# Patient Record
Sex: MALE | Race: OTHER | NOT HISPANIC OR LATINO
[De-identification: names, ages, dates, MRNs, and addresses within clinical notes are randomized per-mention and may not be internally consistent; named-entity substitution may affect disease eponyms.]

---

## 2022-12-07 PROBLEM — Z00.00 ENCOUNTER FOR PREVENTIVE HEALTH EXAMINATION: Status: ACTIVE | Noted: 2022-12-07

## 2022-12-08 ENCOUNTER — APPOINTMENT (OUTPATIENT)
Dept: UROLOGY | Facility: CLINIC | Age: 67
End: 2022-12-08

## 2022-12-08 DIAGNOSIS — E87.8 OTHER DISORDERS OF ELECTROLYTE AND FLUID BALANCE, NOT ELSEWHERE CLASSIFIED: ICD-10-CM

## 2022-12-08 DIAGNOSIS — Z78.9 OTHER SPECIFIED HEALTH STATUS: ICD-10-CM

## 2022-12-08 DIAGNOSIS — N48.1 BALANITIS: ICD-10-CM

## 2022-12-08 DIAGNOSIS — Z86.79 PERSONAL HISTORY OF OTHER DISEASES OF THE CIRCULATORY SYSTEM: ICD-10-CM

## 2022-12-08 PROCEDURE — 99205 OFFICE O/P NEW HI 60 MIN: CPT

## 2022-12-08 RX ORDER — CLOTRIMAZOLE AND BETAMETHASONE DIPROPIONATE 10; .5 MG/G; MG/G
1-0.05 CREAM TOPICAL TWICE DAILY
Qty: 1 | Refills: 1 | Status: ACTIVE | COMMUNITY
Start: 2022-12-08 | End: 1900-01-01

## 2022-12-08 NOTE — HISTORY OF PRESENT ILLNESS
[FreeTextEntry1] : Language: English\par Date of First visit: 12/8/2022\par Accompanied by: self\par Contact info: \par Referring Provider/PCP: Dr. Saavedra\par Fax: [phone]\par \par \par CC/ Problem List:\par \par Left Hydronephrosis\par BPH\par ED/PEJ\par \par ===============================================================================\par FIRST VISIT / Summary:\par Very pleasant 66 yo M referred for BPH/LUTS and CT findings. He also wishes to discuss use of viagra and PEJ. He rates erection 5/10 without medication and 7/10 with. Takes 100mg as needed. He does not wish this to be discussed with his wife.\par \par BPH - IPSS moderate, see intake. Primarily obstructive symptoms. He wished to try medical therapy.\par \par Hydronephrosis - left possible UPJO, some thickening, he is getting hernia repaired next week. He does not have any CVAT today or report any flank pain after drinking, and has about 2 beers/day. 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 Mild thickening of pelvis. Prostate measured approx 55grams by my measurements. Bladder low volume otherwise normal\par \par PATHOLOGY/CYTOLOGY:-------------------------------------------------------------------------------------------\par \par \par VOIDING STUDIES: ----------------------------------------------------------------------------------------------------\par \par \par STONE STUDIES: (Analysis/LLSA)----------------------------------------------------------------------------------\par \par \par \par PROCEDURES: -----------------------------------------------------------------------------------------------\par \par \par \par \par ===============================================================================\par \par PHYSICAL EXAM:\par \par GEN: AAOx3, NAD; \par \par BARRIERS to CARE: none\par \par PSYCH: Appropriate Behavior, Affect Congruent\par \par HEENT: AT/NC Trachea midline. EOMI.\par \par Lungs: No labored breathing.\par \par NEURO: + Movement, all 4 extremities grossly intact without deficits.\par \par SKIN: Warm dry. No visible rashes or ulcers\par \par GAIT: Gait normal, Stability good\par \par ABD: no suprapubic or CVAT\par \par \par  FOCUSED: --------------(done 12/8/2022)------------------------------------------------------------------------\par \par Penis: No tenderness, curvature, plaques. Uncircumcised. Clean but does have some very minor, small erythematous patches with ill defined borders.\par \par Meatus: normal caliber\par \par Testes: Descended bilaterally. Non tender, no palpable masses\par \par Epididymi: No palpable epididymal masses\par \par Scrotum: Scrotal wall normal. No spermatic cord mass. No palpable hydroceles \par \par Prostate: mildly enlarged approx 40g, nontender, symmetric, no nodules \par \par =======================================================================================\par DISCUSSION: \par The patient was prescribed an alpha blocker today for LUTS. Take by mouth once daily with food: do not cut, chew or crush this tablet  The patient understands that this medication may cause dizziness, retrograde ejaculation or nasal congestion among other complications. I recommended that the patient take this medication at night. If the side effects become too bothersome, the medication can be discontinued. The patient knows to alert his  eye doctor if he  requires cataract surgery after having taken this medication. \par \par \par PDE5 inhibitor information:\par The risks of this medication include facial redness and/or flushing, reflux (indigestion), headache, back pain, an erection that won't go away, chest pain or heart attack, dizziness, drop in blood pressure, loss of vision, blue vision, blurry vision and loss of hearing. The patient understands that he cannot take together with nitrates and that should he develop chest pain, he must alert emergency personnel if he has taken within the last 24 hours.  \par \par I recommend that you take the first dose by yourself so you can get acquainted with how this medication makes you feel and whether or not you will have any of the above side effects. The medication may work as soon as 20 minutes on an empty stomach but it can take up to 60 minutes (or longer) on a full stomach. The medication does requires sexual stimulation to work. If you take it without sexual stimulation, you will not get an erection. Please come to the ER for an erection that won't go away, chest pain, or loss of vision or hearing. The patient understood all of these instructions. He will call when he wishes to have medication sent.\par \par The patient has been screened for potential medication interactions. He is not on any po antifungals or HIV meds (protease inhibitors, NNRTI's). \par  \par  \par =======================================================================================\par ASSESSMENT and PLAN\par \par \par 1. BPH\par - tamsulosin\par - discussed RBA\par - f/u in 1 month to discuss\par \par 2. Hydronephrosis\par - UA and culture\par - CT urogram - if signs of delayed emptying, would then get NM lasix scan\par - some pelvic thickening, and hematospermia, together I recommended cystoscopy as well\par - Given asymptomatic would not operate at this time even if UPJO is found. Would monitor for renal function over time\par - cysto in January\par \par 3. Hematospermia\par - normally benign, however needs cysto in any case and this will evaluate urethra / bladder\par \par 4. ED / Premature EJ\par - has been taking sildenafil 100mg with good effect. \par - taking a spray he says works well for the PEJ\par - He will call when he wants script sent to [facility]\par \par 5. Balanitis vs contact dermatitis\par - clotrizone cream x1 week should treat both\par \par =======================================================================================\par \par Greater than 50% of this 70 minute visit was spent in direct face-to-face counseling with the patient or coordination with other care providers \par \par Thank you for allowing me to assist in the care of your patient. Should you have any questions please do not hesitate to reach out to me.\par \par \par Myron Kaiser MD\par [facility]\par [facility] [place]\par [address], [address]\[place] [address]\par T: [phone]\par F: [phone]

## 2022-12-09 LAB
APPEARANCE: CLEAR
BACTERIA: NEGATIVE
BILIRUBIN URINE: NEGATIVE
BLOOD URINE: NEGATIVE
COLOR: YELLOW
GLUCOSE QUALITATIVE U: NEGATIVE
HYALINE CASTS: 0 /LPF
KETONES URINE: NEGATIVE
LEUKOCYTE ESTERASE URINE: NEGATIVE
MICROSCOPIC-UA: NORMAL
NITRITE URINE: NEGATIVE
PH URINE: 7.5
PROTEIN URINE: NORMAL
RED BLOOD CELLS URINE: 1 /HPF
SPECIFIC GRAVITY URINE: 1.01
SQUAMOUS EPITHELIAL CELLS: 0 /HPF
UROBILINOGEN URINE: NORMAL
WHITE BLOOD CELLS URINE: 0 /HPF

## 2022-12-12 LAB — BACTERIA UR CULT: NORMAL

## 2023-01-10 ENCOUNTER — APPOINTMENT (OUTPATIENT)
Dept: UROLOGY | Facility: CLINIC | Age: 68
End: 2023-01-10
Payer: MEDICARE

## 2023-01-10 VITALS
OXYGEN SATURATION: 98 % | HEART RATE: 67 BPM | RESPIRATION RATE: 16 BRPM | SYSTOLIC BLOOD PRESSURE: 148 MMHG | TEMPERATURE: 98.6 F | HEIGHT: 65 IN | WEIGHT: 197 LBS | BODY MASS INDEX: 32.82 KG/M2 | DIASTOLIC BLOOD PRESSURE: 86 MMHG

## 2023-01-10 VITALS
SYSTOLIC BLOOD PRESSURE: 148 MMHG | RESPIRATION RATE: 16 BRPM | OXYGEN SATURATION: 98 % | TEMPERATURE: 98.6 F | DIASTOLIC BLOOD PRESSURE: 86 MMHG | HEART RATE: 67 BPM | WEIGHT: 197 LBS | HEIGHT: 65 IN | BODY MASS INDEX: 32.82 KG/M2

## 2023-01-10 PROCEDURE — 52000 CYSTOURETHROSCOPY: CPT

## 2023-01-10 PROCEDURE — 99215 OFFICE O/P EST HI 40 MIN: CPT | Mod: 25

## 2023-01-10 RX ORDER — SILDENAFIL 100 MG/1
100 TABLET, FILM COATED ORAL
Qty: 10 | Refills: 3 | Status: ACTIVE | COMMUNITY
Start: 2023-01-10 | End: 1900-01-01

## 2023-01-10 NOTE — HISTORY OF PRESENT ILLNESS
[FreeTextEntry1] : Language: English\par Date of First visit: 12/8/2022\par Accompanied by: self\par Contact info: \par Referring Provider/PCP: Dr. Saavedra\par Fax: [phone]\par \par \par CC/ Problem List:\par \par Left Hydronephrosis\par BPH\par ED/PEJ\par \par ===============================================================================\par FIRST VISIT / Summary:\par Very pleasant 66 yo M referred for BPH/LUTS and CT findings. He also wishes to discuss use of viagra and PEJ and balanitis. He rates erection 5/10 without medication and 7/10 with. Takes 100mg as needed. He does not wish this to be discussed with his wife.\par \par BPH - IPSS moderate, see intake. Primarily obstructive symptoms. He wished to try medical therapy.\par \par Hydronephrosis - left possible UPJO, some thickening, he is getting hernia repaired next week. He does not have any CVAT today or report any flank pain after drinking, and has about 2 beers/day. 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 Mild thickening of pelvis. Prostate measured approx 55grams by my measurements. Bladder low volume otherwise normal\par \par PATHOLOGY/CYTOLOGY:-------------------------------------------------------------------------------------------\par \par \par VOIDING STUDIES: ----------------------------------------------------------------------------------------------------\par \par \par STONE STUDIES: (Analysis/LLSA)----------------------------------------------------------------------------------\par \par \par \par PROCEDURES: -----------------------------------------------------------------------------------------------\par 1/10/2023: Cystoscopy demonstrated no tumors, no trabeculation, prostate approx 3.5cm in length, bilobar hypertrophy only moderate coaptation. Some vascularity around bladder neck, symmetric pattern. b/l UOs visualized easily.\par \par \par \par ===============================================================================\par \par PHYSICAL EXAM:\par \par GEN: AAOx3, NAD; \par \par BARRIERS to CARE: none\par \par PSYCH: Appropriate Behavior, Affect Congruent\par \par HEENT: AT/NC Trachea midline. EOMI.\par \par Lungs: No labored breathing.\par \par NEURO: + Movement, all 4 extremities grossly intact without deficits.\par \par SKIN: Warm dry. No visible rashes or ulcers\par \par GAIT: Gait normal, Stability good\par \par ABD: no suprapubic or CVAT\par \par \par  FOCUSED: --------------(done 12/8/2022)------------------------------------------------------------------------\par \par Penis: No tenderness, curvature, plaques. Uncircumcised. Clean but does have some very minor, small erythematous patches with ill defined borders.\par \par Meatus: normal caliber\par \par Testes: Descended bilaterally. Non tender, no palpable masses\par \par Epididymi: No palpable epididymal masses\par \par Scrotum: Scrotal wall normal. No spermatic cord mass. No palpable hydroceles \par \par Prostate: mildly enlarged approx 40g, nontender, symmetric, no nodules \par \par =======================================================================================\par DISCUSSION: \par The patient was prescribed an alpha blocker today for LUTS. Take by mouth once daily with food: do not cut, chew or crush this tablet The patient understands that this medication may cause dizziness, retrograde ejaculation or nasal congestion among other complications. I recommended that the patient take this medication at night. If the side effects become too bothersome, the medication can be discontinued. The patient knows to alert his eye doctor if he requires cataract surgery after having taken this medication. \par \par \par PDE5 inhibitor information:\par The risks of this medication include facial redness and/or flushing, reflux (indigestion), headache, back pain, an erection that won't go away, chest pain or heart attack, dizziness, drop in blood pressure, loss of vision, blue vision, blurry vision and loss of hearing. The patient understands that he cannot take together with nitrates and that should he develop chest pain, he must alert emergency personnel if he has taken within the last 24 hours. \par \par I recommend that you take the first dose by yourself so you can get acquainted with how this medication makes you feel and whether or not you will have any of the above side effects. The medication may work as soon as 20 minutes on an empty stomach but it can take up to 60 minutes (or longer) on a full stomach. The medication does requires sexual stimulation to work. If you take it without sexual stimulation, you will not get an erection. Please come to the ER for an erection that won't go away, chest pain, or loss of vision or hearing. The patient understood all of these instructions. He will call when he wishes to have medication sent.\par \par The patient has been screened for potential medication interactions. He is not on any po antifungals or HIV meds (protease inhibitors, NNRTI's). \par  \par  \par =======================================================================================\par ASSESSMENT and PLAN\par \par \par 1. BPH\par - continue tamsulosin (has improvement)\par - prostate approx 3.5cm in length on cysto, bilobar hypertrophy only moderate coaptation\par \par 2. Hydronephrosis\par - equal contrast uptake and excretion on urogram\par - no lesion on urogram\par - cystoscopy normal\par \par 3. Hematospermia\par - normally benign, however needs cysto in any case and this will evaluate urethra / bladder\par \par 4. ED / Premature EJ\par - has been taking sildenafil 100mg with good effect. He may try 1/2 tab. Rx sent\par - taking a spray he says works well for the PEJ\par \par 5. Balanitis vs contact dermatitis\par - clotrizone cream x1 week should treat both\par - resolved, he will keep cream for any recurrence\par \par =======================================================================================\par \par Greater than 50% of this 60 minute visit was spent in direct face-to-face counseling with the patient or coordination with other care providers for his multiple urologic concerns.\par \par Thank you for allowing me to assist in the care of your patient. Should you have any questions please do not hesitate to reach out to me.\par \par \par Myron Kaiser MD\par [facility]\par [facility] [place]\par [address], [address]\[place] [address]\par T: [phone]\par F: [phone] \par

## 2023-10-10 ENCOUNTER — APPOINTMENT (OUTPATIENT)
Dept: UROLOGY | Facility: CLINIC | Age: 68
End: 2023-10-10
Payer: MEDICARE

## 2023-10-10 VITALS
HEIGHT: 65 IN | OXYGEN SATURATION: 97 % | DIASTOLIC BLOOD PRESSURE: 90 MMHG | TEMPERATURE: 98.2 F | RESPIRATION RATE: 16 BRPM | SYSTOLIC BLOOD PRESSURE: 157 MMHG | BODY MASS INDEX: 32.82 KG/M2 | HEART RATE: 67 BPM | WEIGHT: 197 LBS

## 2023-10-10 PROCEDURE — 99215 OFFICE O/P EST HI 40 MIN: CPT

## 2023-10-10 RX ORDER — VALACYCLOVIR 500 MG/1
500 TABLET, FILM COATED ORAL TWICE DAILY
Qty: 12 | Refills: 9 | Status: ACTIVE | COMMUNITY
Start: 2023-10-10 | End: 1900-01-01

## 2023-10-10 RX ORDER — VALACYCLOVIR 500 MG/1
500 TABLET, FILM COATED ORAL DAILY
Qty: 90 | Refills: 3 | Status: ACTIVE | COMMUNITY
Start: 2023-10-10 | End: 1900-01-01

## 2023-11-14 ENCOUNTER — APPOINTMENT (OUTPATIENT)
Dept: UROLOGY | Facility: CLINIC | Age: 68
End: 2023-11-14

## 2023-12-15 ENCOUNTER — APPOINTMENT (OUTPATIENT)
Dept: UROLOGY | Facility: CLINIC | Age: 68
End: 2023-12-15
Payer: MEDICARE

## 2023-12-15 VITALS
HEIGHT: 65 IN | OXYGEN SATURATION: 97 % | SYSTOLIC BLOOD PRESSURE: 143 MMHG | BODY MASS INDEX: 32.82 KG/M2 | TEMPERATURE: 98.7 F | DIASTOLIC BLOOD PRESSURE: 82 MMHG | RESPIRATION RATE: 16 BRPM | WEIGHT: 197 LBS | HEART RATE: 67 BPM

## 2023-12-15 DIAGNOSIS — B00.9 HERPESVIRAL INFECTION, UNSPECIFIED: ICD-10-CM

## 2023-12-15 DIAGNOSIS — N40.1 BENIGN PROSTATIC HYPERPLASIA WITH LOWER URINARY TRACT SYMPMS: ICD-10-CM

## 2023-12-15 PROCEDURE — 99213 OFFICE O/P EST LOW 20 MIN: CPT

## 2023-12-15 RX ORDER — TAMSULOSIN HYDROCHLORIDE 0.4 MG/1
0.4 CAPSULE ORAL
Qty: 90 | Refills: 3 | Status: ACTIVE | COMMUNITY
Start: 2022-12-08 | End: 1900-01-01

## 2023-12-15 NOTE — HISTORY OF PRESENT ILLNESS
[FreeTextEntry1] : KEN ESPINOSA Apr 12 1955  Language: English Date of First visit: 12/8/2022 Accompanied by: self Contact info: Referring Provider/PCP: Dr. Saavedra Fax: [phone]  CC/ Problem List: Left Hydronephrosis BPH ED/PEJ ===============================================================================  FIRST VISIT / Summary: Very pleasant 68 yo M referred for BPH/LUTS and CT findings. He also wishes to discuss use of viagra and PEJ and balanitis. He rates erection 5/10 without medication and 7/10 with. Takes 100mg as needed. He does not wish this to be discussed with his wife.  BPH - IPSS moderate, see intake. Primarily obstructive symptoms. He wished to try medical therapy.  Hydronephrosis - left possible UPJO, some thickening, he is getting hernia repaired next week. He does not have any CVAT today or report any flank pain after drinking. has about 2 beers/day.  Balanitis - he has been applying an antibiotic cream, has mild irritation and does clean well.  -------------------------------------------------------------------------------------------  INTERVAL VISITS: The patient's medications and allergies were reviewed and edited below. Dated 10/10/2023  He still notes no pain associated with eating watermellon, drinking beer, etc. He had new CT showing punctates non-obstructing stones. Again ?UPJO however given no symptoms no need to pursue at age 68  LUTS: he says he has to void twice now to finish. He does feel empty when he is done. 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 Mild thickening of pelvis. Prostate measured approx 55grams by my measurements. Bladder low volume otherwise normal 1/3/2023: CT urogram demonstrated equal uptake of contrast bilaterally, ruling out significant obstruction of left kidney. No filling defects or masses. 8/18/2023: Again CT with ?left UPJO and new punctate stones in the renal pelvis.  PATHOLOGY/CYTOLOGY:-------------------------------------------------------------------------------------------   VOIDING STUDIES: ----------------------------------------------------------------------------------------------------   STONE STUDIES: (Analysis/LLSA)----------------------------------------------------------------------------------    PROCEDURES: ----------------------------------------------------------------------------------------------- 1/10/2023: Cystoscopy demonstrated no tumors, no trabeculation, prostate approx 3.5cm in length, bilobar hypertrophy only moderate coaptation. Some vascularity around bladder neck, symmetric pattern. b/l UOs visualized easily.    =============================================================================== PHYSICAL EXAM:  GEN: AAOx3, NAD; BARRIERS to CARE: none PSYCH: Appropriate Behavior, Affect Congruent HEENT: AT/NC Trachea midline. EOMI. Lungs: No labored breathing. NEURO: + Movement, all 4 extremities grossly intact without deficits. SKIN: Warm dry. No visible rashes or ulcers GAIT: Gait normal, Stability good ABD: no suprapubic or CVAT   FOCUSED: --------------------------------------------------------------------------------------  10/10/2023 chaperone offered and declined  Penis: No tenderness, curvature, plaques. Uncircumcised, Small erythematous patches with apparent loss of superficial epidermis. No vesicles seen, raw bases only. Meatus: normal caliber Testes: Descended bilaterally. Non tender, no palpable masses Epididymi: No palpable epididymal masses Scrotum: Scrotal wall normal. No spermatic cord mass. No palpable hydroceles Prostate: mildly enlarged approx 40g, nontender, symmetric, no nodules  ======================================================================================= DISCUSSION:  =======================================================================================  ASSESSMENT and PLAN  1. BPH - continue tamsulosin (has improvement) renew - prostate approx 3.5cm in length on cysto, bilobar hypertrophy only moderate coaptation  2. Hydronephrosis - equal contrast uptake and excretion on urogram - no lesion on urogram, cystoscopy normal - he did have several episodes of left flank pain ? MSK vs passing small stone - NM lasix scan (not done) is an option but I do not feel necessary  3. ED / Premature EJ - has been taking sildenafil 100mg with less effect than before. 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 Saavedra - valacyclovir sent 6 days now then daily prophylaxis if he has another outbreak soon - follow- up in 1 month  ======================================================================================= The total time personally spent preparing for this visit (reviewing test results, obtaining external history) and during the visit (ordering tests/medications, spent face to face with the patient / family and counseling them on the above), as well as after the visit (on clinical documentation and coordination with other care providers) was approximately 25 minutes.  Thank you for allowing me to assist in the care of your patient. Should you have any questions please do not hesitate to reach out to me.  Myron Kaiser MD        [facility]  [place] Office: [address], [address] [place] [address] T: [phone] F: [phone]  [place] Office: [address] [place] [address], 1st [place] [address] T: [phone] F: [phone]

## 2024-05-20 ENCOUNTER — APPOINTMENT (OUTPATIENT)
Dept: UROLOGY | Facility: CLINIC | Age: 69
End: 2024-05-20
Payer: MEDICARE

## 2024-05-20 VITALS
HEART RATE: 85 BPM | RESPIRATION RATE: 16 BRPM | SYSTOLIC BLOOD PRESSURE: 155 MMHG | OXYGEN SATURATION: 96 % | WEIGHT: 197 LBS | TEMPERATURE: 97.8 F | BODY MASS INDEX: 32.82 KG/M2 | DIASTOLIC BLOOD PRESSURE: 84 MMHG | HEIGHT: 65 IN

## 2024-05-20 PROCEDURE — G2211 COMPLEX E/M VISIT ADD ON: CPT | Mod: NC,1L

## 2024-05-20 PROCEDURE — 51798 US URINE CAPACITY MEASURE: CPT

## 2024-05-20 PROCEDURE — 99215 OFFICE O/P EST HI 40 MIN: CPT | Mod: 25

## 2024-05-20 PROCEDURE — 51741 ELECTRO-UROFLOWMETRY FIRST: CPT

## 2024-05-20 NOTE — HISTORY OF PRESENT ILLNESS
[FreeTextEntry1] : KEN ESPINOSA Apr 12 1955  Language: English Date of First visit: 12/8/2022 Accompanied by: self Contact info: Referring Provider/PCP: Dr. Saavedra Fax: [phone]  CC/ Problem List: Left Hydronephrosis BPH ED/PEJ ===============================================================================  FIRST VISIT / Summary: Very pleasant 68 yo M referred for BPH/LUTS and CT findings. He also wishes to discuss use of viagra and PEJ and balanitis. He rates erection 5/10 without medication and 7/10 with. Takes 100mg as needed. He does not wish this to be discussed with his wife.  BPH - IPSS moderate, see intake. Primarily obstructive symptoms. He wished to try medical therapy.  Hydronephrosis - left possible UPJO, some thickening, he is getting hernia repaired next week. He does not have any CVAT today or report any flank pain after drinking. has about 2 beers/day.  Balanitis - he has been applying an antibiotic cream, has mild irritation and does clean well.  -------------------------------------------------------------------------------------------  INTERVAL VISITS: The patient's medications and allergies were reviewed and edited below. Dated 05/20/2024  On xmas had 12hrs left flank pain. Was in [place] for three months here for follow-up. April again had left flank pain. It is bothering him significantly.   LUTS: frequency is bothering him. Drinks 8 glasses of water and 2 beers daily.   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 Mild thickening of pelvis. Prostate measured approx 55grams by my measurements. Bladder low volume otherwise normal 1/3/2023: CT urogram demonstrated equal uptake of contrast bilaterally, ruling out significant obstruction of left kidney. No filling defects or masses. 8/18/2023: Again CT with ?left UPJO and new punctate stones in the renal pelvis.  PATHOLOGY/CYTOLOGY:-------------------------------------------------------------------------------------------   VOIDING STUDIES: ---------------------------------------------------------------------------------------------------- 05/20/2024 Uroflow peak 13.9cc, volume only 83. PVR 20  STONE STUDIES: (Analysis/LLSA)----------------------------------------------------------------------------------    PROCEDURES: ----------------------------------------------------------------------------------------------- 1/10/2023: Cystoscopy demonstrated no tumors, no trabeculation, prostate approx 3.5cm in length, bilobar hypertrophy only moderate coaptation. Some vascularity around bladder neck, symmetric pattern. b/l UOs visualized easily.    =============================================================================== PHYSICAL EXAM:  GEN: AAOx3, NAD; BARRIERS to CARE: none PSYCH: Appropriate Behavior, Affect Congruent HEENT: AT/NC Trachea midline. EOMI. Lungs: No labored breathing. NEURO: + Movement, all 4 extremities grossly intact without deficits. SKIN: Warm dry. No visible rashes or ulcers GAIT: Gait normal, Stability good ABD: no suprapubic or CVAT   FOCUSED: --------------------------------------------------------------------------------------  10/10/2023 chaperone offered and declined  Penis: No tenderness, curvature, plaques. Uncircumcised, Small erythematous patches with apparent loss of superficial epidermis. No vesicles seen, raw bases only. Meatus: normal caliber Testes: Descended bilaterally. Non tender, no palpable masses Epididymi: No palpable epididymal masses Scrotum: Scrotal wall normal. No spermatic cord mass. No palpable hydroceles Prostate: mildly enlarged approx 40g, nontender, symmetric, no nodules  ======================================================================================= DISCUSSION:  ======================================================================================= ASSESSMENT and PLAN  1. BPH - continue tamsulosin (has improvement) renew - prostate approx 3.5cm in length on cysto, bilobar hypertrophy only moderate coaptation  2. Hydronephrosis - equal contrast uptake and excretion on urogram - no lesion on urogram, cystoscopy normal - NM scan showed possible UPJO as well, he will repeat this. He will send BMP, CBC etc with PCP wednesday and give me results  3. ED / Premature EJ - has been taking sildenafil 100mg with less effect than before. - has this and the spray - just not using at the moment.   4. 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 Saavedra - valacyclovir sent 6 days now then daily prophylaxis if he has another outbreak soon - follow- up in 1 month  ======================================================================================= The total time personally spent preparing for this visit (reviewing test results, obtaining external history) and during the visit (ordering tests/medications, spent face to face with the patient / family and counseling them on the above), as well as after the visit (on clinical documentation and coordination with other care providers) was approximately 45 minutes.  Thank you for allowing me to assist in the care of your patient. Should you have any questions please do not hesitate to reach out to me.  Myron Kaiser MD [facility] Physician [facility] for Urology  [place] Office: [address], [address] [place] [address] T: [phone] F: [phone]  [place] Office: [address], 1st [place] [address] T: [phone] F: [phone]

## 2024-06-06 ENCOUNTER — APPOINTMENT (OUTPATIENT)
Dept: NUCLEAR MEDICINE | Facility: HOSPITAL | Age: 69
End: 2024-06-06

## 2024-06-06 ENCOUNTER — OUTPATIENT (OUTPATIENT)
Dept: OUTPATIENT SERVICES | Facility: HOSPITAL | Age: 69
LOS: 1 days | End: 2024-06-06
Payer: MEDICAID

## 2024-06-06 DIAGNOSIS — N13.30 UNSPECIFIED HYDRONEPHROSIS: ICD-10-CM

## 2024-06-06 PROCEDURE — 78708 K FLOW/FUNCT IMAGE W/DRUG: CPT

## 2024-06-06 PROCEDURE — 78708 K FLOW/FUNCT IMAGE W/DRUG: CPT | Mod: 26

## 2024-06-06 PROCEDURE — A9562: CPT

## 2024-06-21 ENCOUNTER — APPOINTMENT (OUTPATIENT)
Dept: UROLOGY | Facility: CLINIC | Age: 69
End: 2024-06-21
Payer: MEDICARE

## 2024-06-21 VITALS
BODY MASS INDEX: 32.82 KG/M2 | OXYGEN SATURATION: 96 % | HEART RATE: 66 BPM | RESPIRATION RATE: 16 BRPM | WEIGHT: 197 LBS | HEIGHT: 65 IN | DIASTOLIC BLOOD PRESSURE: 74 MMHG | SYSTOLIC BLOOD PRESSURE: 131 MMHG | TEMPERATURE: 97.7 F

## 2024-06-21 DIAGNOSIS — N13.30 UNSPECIFIED HYDRONEPHROSIS: ICD-10-CM

## 2024-06-21 DIAGNOSIS — N52.9 MALE ERECTILE DYSFUNCTION, UNSPECIFIED: ICD-10-CM

## 2024-06-21 DIAGNOSIS — N13.5 CROSSING VESSEL AND STRICTURE OF URETER W/OUT HYDRONEPHROSIS: ICD-10-CM

## 2024-06-21 PROCEDURE — G2211 COMPLEX E/M VISIT ADD ON: CPT

## 2024-06-21 PROCEDURE — 99215 OFFICE O/P EST HI 40 MIN: CPT

## 2024-06-22 PROBLEM — N13.5 URETEROPELVIC JUNCTION OBSTRUCTION: Status: ACTIVE | Noted: 2024-06-22

## 2024-06-22 PROBLEM — N52.9 ERECTILE DYSFUNCTION: Status: ACTIVE | Noted: 2023-01-10

## 2024-06-22 PROBLEM — N13.30 HYDRONEPHROSIS, LEFT: Status: ACTIVE | Noted: 2023-10-10

## 2024-06-22 NOTE — HISTORY OF PRESENT ILLNESS
[FreeTextEntry1] : KEN ESPINOSA Apr 12 1955  Language: English Date of First visit: 12/8/2022 Accompanied by: self Contact info: Referring Provider/PCP: Dr. Saavedra Fax: [phone]  CC/ Problem List: Left Hydronephrosis BPH ED/PEJ ===============================================================================  FIRST VISIT / Summary: Very pleasant 66 yo M referred for BPH/LUTS and CT findings. He also wishes to discuss use of viagra and PEJ and balanitis. He rates erection 5/10 without medication and 7/10 with. Takes 100mg as needed. He does not wish this to be discussed with his wife.  BPH - IPSS moderate, see intake. Primarily obstructive symptoms. He wished to try medical therapy.  Hydronephrosis - left possible UPJO, some thickening, he is getting hernia repaired next week. He does not have any CVAT today or report any flank pain after drinking. has about 2 beers/day.  Balanitis - he has been applying an antibiotic cream, has mild irritation and does clean well.  -------------------------------------------------------------------------------------------  INTERVAL VISITS: The patient's medications and allergies were reviewed and edited below. Dated  06/21/2024  Here to review recent renal study. Feel well. During the study he states he had a lot of pain in left kidney. Otherwise, recently less painful episodes. Pain seems to be with large fluid challenge, alcohol, diuretics etc thus we discussed surgical correction. He will monitor symptoms over the next few months and consider repair in the fall.  Taking tamsulosin as prescribed, still with double voids however content with voiding pattern. ED: Not using Sildenafil. Verbalizes medicine does not work for him. 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 Mild thickening of pelvis. Prostate measured approx 55grams by my measurements. Bladder low volume otherwise normal 1/3/2023: CT urogram demonstrated equal uptake of contrast bilaterally, ruling out significant obstruction of left kidney. No filling defects or masses. 8/18/2023: Again CT with ?left UPJO and new punctate stones in the renal pelvis. 6/6/2024 Renal Study- Split renal function is within physiological range. Findings consistent with obstructive pattern in the left kidney. Right side drained prior to lasix and T1/2 not calculated, T1/2 not reached on left due to poor drainage, significantly elevated.  PATHOLOGY/CYTOLOGY:-------------------------------------------------------------------------------------------   VOIDING STUDIES: ---------------------------------------------------------------------------------------------------- 05/20/2024 Uroflow peak 13.9cc, volume only 83. PVR 20  STONE STUDIES: (Analysis/LLSA)----------------------------------------------------------------------------------    PROCEDURES: ----------------------------------------------------------------------------------------------- 1/10/2023: Cystoscopy demonstrated no tumors, no trabeculation, prostate approx 3.5cm in length, bilobar hypertrophy only moderate coaptation. Some vascularity around bladder neck, symmetric pattern. b/l UOs visualized easily.    =============================================================================== PHYSICAL EXAM:  GEN: AAOx3, NAD; BARRIERS to CARE: none PSYCH: Appropriate Behavior, Affect Congruent HEENT: AT/NC Trachea midline. EOMI. Lungs: No labored breathing. NEURO: + Movement, all 4 extremities grossly intact without deficits. SKIN: Warm dry. No visible rashes or ulcers GAIT: Gait normal, Stability good ABD: no suprapubic or CVAT   FOCUSED: --------------------------------------------------------------------------------------  10/10/2023 chaperone offered and declined  Penis: No tenderness, curvature, plaques. Uncircumcised, Small erythematous patches with apparent loss of superficial epidermis. No vesicles seen, raw bases only. Meatus: normal caliber Testes: Descended bilaterally. Non tender, no palpable masses Epididymi: No palpable epididymal masses Scrotum: Scrotal wall normal. No spermatic cord mass. No palpable hydroceles Prostate: mildly enlarged approx 40g, nontender, symmetric, no nodules  ======================================================================================= DISCUSSION:  ======================================================================================= ASSESSMENT and PLAN  1. BPH - continue tamsulosin (has improvement) renew - prostate approx 3.5cm in length on cysto, bilobar hypertrophy only moderate coaptation  2. Hydronephrosis - equal contrast uptake and excretion on urogram - no lesion on urogram, cystoscopy normal - NM scan showed possible UPJO as well, he will repeat this. He will send BMP, CBC etc with PCP wednesday and give me results  3. ED / Premature EJ - has been taking sildenafil 100mg with less effect than before. - has this and the spray - just not using at the moment.  ======================================================================================= The total time personally spent preparing for this visit (reviewing test results, obtaining external history) and during the visit (ordering tests/medications, spent face to face with the patient / family and counseling them on the above), as well as after the visit (on clinical documentation and coordination with other care providers) was approximately 45 minutes discussing surgical options and other conditions.  The patient's imaging study prior to this visit was personally reviewed and the above is my independent interpretation for the  organ systems.   Thank you for allowing me to assist in the care of your patient. Should you have any questions please do not hesitate to reach out to me.  Myron Kaiser MD [facility] Physician [facility] for Urology  [place] Office: [address], [address] [place] [address] T: [phone] F: [phone]  [place] Office: [address] [place] [address], 1st [place] [address] T: [phone] F: [phone]

## 2024-11-18 ENCOUNTER — APPOINTMENT (OUTPATIENT)
Dept: UROLOGY | Facility: CLINIC | Age: 69
End: 2024-11-18
Payer: MEDICARE

## 2024-11-18 VITALS
RESPIRATION RATE: 14 BRPM | SYSTOLIC BLOOD PRESSURE: 140 MMHG | HEART RATE: 76 BPM | TEMPERATURE: 98 F | DIASTOLIC BLOOD PRESSURE: 69 MMHG | BODY MASS INDEX: 26.36 KG/M2 | WEIGHT: 164 LBS | HEIGHT: 66 IN | OXYGEN SATURATION: 95 %

## 2024-11-18 DIAGNOSIS — N40.1 BENIGN PROSTATIC HYPERPLASIA WITH LOWER URINARY TRACT SYMPMS: ICD-10-CM

## 2024-11-18 DIAGNOSIS — N13.30 UNSPECIFIED HYDRONEPHROSIS: ICD-10-CM

## 2024-11-18 DIAGNOSIS — N13.5 CROSSING VESSEL AND STRICTURE OF URETER W/OUT HYDRONEPHROSIS: ICD-10-CM

## 2024-11-18 PROCEDURE — G2211 COMPLEX E/M VISIT ADD ON: CPT

## 2024-11-18 PROCEDURE — 99215 OFFICE O/P EST HI 40 MIN: CPT

## 2024-11-18 RX ORDER — LOSARTAN POTASSIUM 100 MG/1
100 TABLET, FILM COATED ORAL
Refills: 0 | Status: ACTIVE | COMMUNITY

## 2024-11-18 RX ORDER — ALLOPURINOL 100 MG/1
100 TABLET ORAL
Refills: 0 | Status: ACTIVE | COMMUNITY

## 2024-11-18 RX ORDER — ROSUVASTATIN CALCIUM 5 MG/1
TABLET, FILM COATED ORAL
Refills: 0 | Status: ACTIVE | COMMUNITY

## 2024-11-18 RX ORDER — HYDROCHLOROTHIAZIDE 25 MG/1
25 TABLET ORAL
Refills: 0 | Status: ACTIVE | COMMUNITY

## 2024-11-18 RX ORDER — AMLODIPINE BESYLATE 10 MG/1
10 TABLET ORAL
Refills: 0 | Status: ACTIVE | COMMUNITY

## 2024-11-18 RX ORDER — ROSUVASTATIN CALCIUM 20 MG/1
20 TABLET, FILM COATED ORAL
Refills: 0 | Status: ACTIVE | COMMUNITY

## 2024-11-19 LAB — ABO + RH PNL BLD: NORMAL

## 2024-11-20 LAB
ALBUMIN SERPL ELPH-MCNC: 4.8 G/DL
ALP BLD-CCNC: 77 U/L
ALT SERPL-CCNC: 33 U/L
ANION GAP SERPL CALC-SCNC: 15 MMOL/L
AST SERPL-CCNC: 28 U/L
BACTERIA UR CULT: NORMAL
BASOPHILS # BLD AUTO: 0.06 K/UL
BASOPHILS NFR BLD AUTO: 0.9 %
BILIRUB SERPL-MCNC: 0.3 MG/DL
BUN SERPL-MCNC: 18 MG/DL
CALCIUM SERPL-MCNC: 9.8 MG/DL
CHLORIDE SERPL-SCNC: 98 MMOL/L
CO2 SERPL-SCNC: 24 MMOL/L
CREAT SERPL-MCNC: 0.76 MG/DL
EGFR: 96 ML/MIN/1.73M2
EOSINOPHIL # BLD AUTO: 0.11 K/UL
EOSINOPHIL NFR BLD AUTO: 1.6 %
ESTIMATED AVERAGE GLUCOSE: 143 MG/DL
GLUCOSE SERPL-MCNC: 109 MG/DL
HBA1C MFR BLD HPLC: 6.6 %
HCT VFR BLD CALC: 46.9 %
HGB BLD-MCNC: 15.4 G/DL
IMM GRANULOCYTES NFR BLD AUTO: 0.1 %
LYMPHOCYTES # BLD AUTO: 2.13 K/UL
LYMPHOCYTES NFR BLD AUTO: 31.1 %
MAN DIFF?: NORMAL
MCHC RBC-ENTMCNC: 32 PG
MCHC RBC-ENTMCNC: 32.8 G/DL
MCV RBC AUTO: 97.3 FL
MONOCYTES # BLD AUTO: 0.61 K/UL
MONOCYTES NFR BLD AUTO: 8.9 %
NEUTROPHILS # BLD AUTO: 3.92 K/UL
NEUTROPHILS NFR BLD AUTO: 57.4 %
PLATELET # BLD AUTO: 158 K/UL
POTASSIUM SERPL-SCNC: 3.7 MMOL/L
PROT SERPL-MCNC: 8.2 G/DL
RBC # BLD: 4.82 M/UL
RBC # FLD: 13 %
SODIUM SERPL-SCNC: 137 MMOL/L
WBC # FLD AUTO: 6.84 K/UL

## 2024-12-10 NOTE — PATIENT PROFILE ADULT - NSPRESCRALCFREQ_GEN_A_NUR
2-4 times a month Advancement Flap (Single) Text: The defect edges were debeveled with a #15 scalpel blade.  Given the location of the defect and the proximity to free margins a single advancement flap was deemed most appropriate.  Using a sterile surgical marker, an appropriate advancement flap was drawn incorporating the defect and placing the expected incisions within the relaxed skin tension lines where possible.    The area thus outlined was incised deep to adipose tissue with a #15 scalpel blade.  The skin margins were undermined to an appropriate distance in all directions utilizing iris scissors.

## 2024-12-10 NOTE — PATIENT PROFILE ADULT - FALL HARM RISK - UNIVERSAL INTERVENTIONS
We sent out a culture.   We will call you with results in 2 to 3 days.  If you have the patient portal, you can review your results in 2 to 3 days.     Bed in lowest position, wheels locked, appropriate side rails in place/Call bell, personal items and telephone in reach/Instruct patient to call for assistance before getting out of bed or chair/Non-slip footwear when patient is out of bed/[place] to call system/Physically safe environment - no spills, clutter or unnecessary equipment/Purposeful Proactive Rounding/Room/bathroom lighting operational, light cord in reach

## 2024-12-11 ENCOUNTER — INPATIENT (INPATIENT)
Facility: HOSPITAL | Age: 69
LOS: 0 days | Discharge: HOME | DRG: 661 | End: 2024-12-12
Attending: STUDENT IN AN ORGANIZED HEALTH CARE EDUCATION/TRAINING PROGRAM | Admitting: STUDENT IN AN ORGANIZED HEALTH CARE EDUCATION/TRAINING PROGRAM
Payer: MEDICARE

## 2024-12-11 ENCOUNTER — TRANSCRIPTION ENCOUNTER (OUTPATIENT)
Age: 69
End: 2024-12-11

## 2024-12-11 ENCOUNTER — RESULT REVIEW (OUTPATIENT)
Age: 69
End: 2024-12-11

## 2024-12-11 ENCOUNTER — APPOINTMENT (OUTPATIENT)
Dept: UROLOGY | Facility: HOSPITAL | Age: 69
End: 2024-12-11

## 2024-12-11 VITALS
HEIGHT: 66 IN | DIASTOLIC BLOOD PRESSURE: 85 MMHG | SYSTOLIC BLOOD PRESSURE: 165 MMHG | HEART RATE: 85 BPM | OXYGEN SATURATION: 98 % | RESPIRATION RATE: 16 BRPM | TEMPERATURE: 98 F | WEIGHT: 189.38 LBS

## 2024-12-11 DIAGNOSIS — N13.5 CROSSING VESSEL AND STRICTURE OF URETER WITHOUT HYDRONEPHROSIS: ICD-10-CM

## 2024-12-11 DIAGNOSIS — Z98.890 OTHER SPECIFIED POSTPROCEDURAL STATES: Chronic | ICD-10-CM

## 2024-12-11 LAB
BLD GP AB SCN SERPL QL: NEGATIVE — SIGNIFICANT CHANGE UP
RH IG SCN BLD-IMP: POSITIVE — SIGNIFICANT CHANGE UP

## 2024-12-11 PROCEDURE — 50544 LAPAROSCOPY PYELOPLASTY: CPT | Mod: LT

## 2024-12-11 PROCEDURE — 50544 LAPAROSCOPY PYELOPLASTY: CPT | Mod: 82,LT

## 2024-12-11 PROCEDURE — 52332 CYSTOSCOPY AND TREATMENT: CPT | Mod: LT

## 2024-12-11 PROCEDURE — 88305 TISSUE EXAM BY PATHOLOGIST: CPT | Mod: 26

## 2024-12-11 PROCEDURE — 74420 UROGRAPHY RTRGR +-KUB: CPT | Mod: 26,82

## 2024-12-11 PROCEDURE — 74420 UROGRAPHY RTRGR +-KUB: CPT | Mod: 26

## 2024-12-11 DEVICE — LIGATING CLIPS WECK HEMOLOK POLYMER LARGE (PURPLE) 6: Type: IMPLANTABLE DEVICE | Status: FUNCTIONAL

## 2024-12-11 DEVICE — SURGICEL FIBRILLAR 4 X 4": Type: IMPLANTABLE DEVICE | Status: FUNCTIONAL

## 2024-12-11 DEVICE — URETERAL CATH OPEN END 5FR 70CM: Type: IMPLANTABLE DEVICE | Status: FUNCTIONAL

## 2024-12-11 DEVICE — URETERAL STENT CONTOUR 6FR 26CM: Type: IMPLANTABLE DEVICE | Status: FUNCTIONAL

## 2024-12-11 DEVICE — LIGATING CLIPS WECK HEMOLOK POLYMER MEDIUM-LARGE (GREEN) 6: Type: IMPLANTABLE DEVICE | Status: FUNCTIONAL

## 2024-12-11 DEVICE — GUIDEWIRE SENSOR DUAL-FLEX NITINOL STRAIGHT .035" X 150CM: Type: IMPLANTABLE DEVICE | Status: FUNCTIONAL

## 2024-12-11 RX ORDER — TAMSULOSIN HYDROCHLORIDE 0.4 MG/1
0.4 CAPSULE ORAL AT BEDTIME
Refills: 0 | Status: DISCONTINUED | OUTPATIENT
Start: 2024-12-11 | End: 2024-12-12

## 2024-12-11 RX ORDER — OXYBUTYNIN CHLORIDE 5 MG
5 TABLET ORAL EVERY 8 HOURS
Refills: 0 | Status: DISCONTINUED | OUTPATIENT
Start: 2024-12-11 | End: 2024-12-12

## 2024-12-11 RX ORDER — ROSUVASTATIN CALCIUM 5 MG/1
1 TABLET, FILM COATED ORAL
Refills: 0 | DISCHARGE

## 2024-12-11 RX ORDER — HYDROMORPHONE HYDROCHLORIDE 2 MG/1
0.5 TABLET ORAL EVERY 4 HOURS
Refills: 0 | Status: DISCONTINUED | OUTPATIENT
Start: 2024-12-11 | End: 2024-12-12

## 2024-12-11 RX ORDER — HEPARIN SODIUM,PORCINE 1000/ML
5000 VIAL (ML) INJECTION ONCE
Refills: 0 | Status: COMPLETED | OUTPATIENT
Start: 2024-12-11 | End: 2024-12-11

## 2024-12-11 RX ORDER — HEPARIN SODIUM,PORCINE 1000/ML
5000 VIAL (ML) INJECTION EVERY 8 HOURS
Refills: 0 | Status: DISCONTINUED | OUTPATIENT
Start: 2024-12-11 | End: 2024-12-12

## 2024-12-11 RX ORDER — HYDROCHLOROTHIAZIDE 50 MG
1 TABLET ORAL
Refills: 0 | DISCHARGE

## 2024-12-11 RX ORDER — CEFAZOLIN SODIUM 10 G
2000 VIAL (EA) INJECTION EVERY 8 HOURS
Refills: 0 | Status: COMPLETED | OUTPATIENT
Start: 2024-12-11 | End: 2024-12-12

## 2024-12-11 RX ORDER — SODIUM CHLORIDE 9 MG/ML
1000 INJECTION, SOLUTION INTRAMUSCULAR; INTRAVENOUS; SUBCUTANEOUS
Refills: 0 | Status: DISCONTINUED | OUTPATIENT
Start: 2024-12-11 | End: 2024-12-12

## 2024-12-11 RX ORDER — PHENAZOPYRIDINE HCL 200 MG
100 TABLET ORAL EVERY 8 HOURS
Refills: 0 | Status: DISCONTINUED | OUTPATIENT
Start: 2024-12-11 | End: 2024-12-12

## 2024-12-11 RX ORDER — TAMSULOSIN HYDROCHLORIDE 0.4 MG/1
1 CAPSULE ORAL
Refills: 0 | DISCHARGE

## 2024-12-11 RX ORDER — ACETAMINOPHEN 500MG 500 MG/1
975 TABLET, COATED ORAL EVERY 6 HOURS
Refills: 0 | Status: DISCONTINUED | OUTPATIENT
Start: 2024-12-11 | End: 2024-12-12

## 2024-12-11 RX ORDER — ONDANSETRON HYDROCHLORIDE 4 MG/1
4 TABLET, FILM COATED ORAL EVERY 6 HOURS
Refills: 0 | Status: DISCONTINUED | OUTPATIENT
Start: 2024-12-11 | End: 2024-12-12

## 2024-12-11 RX ORDER — LOSARTAN POTASSIUM 100 MG/1
0 TABLET, FILM COATED ORAL
Refills: 0 | DISCHARGE

## 2024-12-11 RX ORDER — LIDOCAINE 40 MG/G
1 CREAM TOPICAL EVERY 24 HOURS
Refills: 0 | Status: DISCONTINUED | OUTPATIENT
Start: 2024-12-11 | End: 2024-12-12

## 2024-12-11 RX ORDER — ACETAMINOPHEN 500MG 500 MG/1
1000 TABLET, COATED ORAL ONCE
Refills: 0 | Status: COMPLETED | OUTPATIENT
Start: 2024-12-11 | End: 2024-12-11

## 2024-12-11 RX ORDER — AMLODIPINE BESYLATE 10 MG/1
1 TABLET ORAL
Refills: 0 | DISCHARGE

## 2024-12-11 RX ORDER — OXYCODONE HYDROCHLORIDE 30 MG/1
5 TABLET ORAL EVERY 6 HOURS
Refills: 0 | Status: DISCONTINUED | OUTPATIENT
Start: 2024-12-11 | End: 2024-12-12

## 2024-12-11 RX ORDER — BENZOCAINE, MENTHOL 15; 3.6 MG/1; MG/1
1 LOZENGE ORAL EVERY 6 HOURS
Refills: 0 | Status: DISCONTINUED | OUTPATIENT
Start: 2024-12-11 | End: 2024-12-12

## 2024-12-11 RX ADMIN — ACETAMINOPHEN 500MG 975 MILLIGRAM(S): 500 TABLET, COATED ORAL at 13:50

## 2024-12-11 RX ADMIN — Medication 5000 UNIT(S): at 22:28

## 2024-12-11 RX ADMIN — ACETAMINOPHEN 500MG 1000 MILLIGRAM(S): 500 TABLET, COATED ORAL at 06:42

## 2024-12-11 RX ADMIN — ACETAMINOPHEN 500MG 975 MILLIGRAM(S): 500 TABLET, COATED ORAL at 12:46

## 2024-12-11 RX ADMIN — Medication 5000 UNIT(S): at 13:56

## 2024-12-11 RX ADMIN — TAMSULOSIN HYDROCHLORIDE 0.4 MILLIGRAM(S): 0.4 CAPSULE ORAL at 22:28

## 2024-12-11 RX ADMIN — HYDROMORPHONE HYDROCHLORIDE 0.5 MILLIGRAM(S): 2 TABLET ORAL at 12:46

## 2024-12-11 RX ADMIN — HYDROMORPHONE HYDROCHLORIDE 0.5 MILLIGRAM(S): 2 TABLET ORAL at 13:01

## 2024-12-11 RX ADMIN — Medication 100 MILLIGRAM(S): at 13:56

## 2024-12-11 RX ADMIN — Medication 5000 UNIT(S): at 06:45

## 2024-12-11 RX ADMIN — SODIUM CHLORIDE 120 MILLILITER(S): 9 INJECTION, SOLUTION INTRAMUSCULAR; INTRAVENOUS; SUBCUTANEOUS at 22:28

## 2024-12-11 RX ADMIN — SODIUM CHLORIDE 120 MILLILITER(S): 9 INJECTION, SOLUTION INTRAMUSCULAR; INTRAVENOUS; SUBCUTANEOUS at 12:47

## 2024-12-11 RX ADMIN — Medication 100 MILLIGRAM(S): at 18:11

## 2024-12-11 RX ADMIN — Medication 100 MILLIGRAM(S): at 22:28

## 2024-12-11 RX ADMIN — ACETAMINOPHEN 500MG 975 MILLIGRAM(S): 500 TABLET, COATED ORAL at 19:51

## 2024-12-11 NOTE — BRIEF OPERATIVE NOTE - OPERATION/FINDINGS
Transmesenteric Robotic L pyeloplasty with retrograde pyelogram and retrograde L ureteral stent insertion [id number]

## 2024-12-12 ENCOUNTER — TRANSCRIPTION ENCOUNTER (OUTPATIENT)
Age: 69
End: 2024-12-12

## 2024-12-12 VITALS
SYSTOLIC BLOOD PRESSURE: 123 MMHG | OXYGEN SATURATION: 96 % | TEMPERATURE: 98 F | HEART RATE: 57 BPM | DIASTOLIC BLOOD PRESSURE: 69 MMHG | RESPIRATION RATE: 17 BRPM

## 2024-12-12 LAB
ANION GAP SERPL CALC-SCNC: 9 MMOL/L — SIGNIFICANT CHANGE UP (ref 5–17)
BASOPHILS # BLD AUTO: 0.01 K/UL — SIGNIFICANT CHANGE UP (ref 0–0.2)
BASOPHILS NFR BLD AUTO: 0.1 % — SIGNIFICANT CHANGE UP (ref 0–2)
BUN SERPL-MCNC: 13 MG/DL — SIGNIFICANT CHANGE UP (ref 7–23)
CALCIUM SERPL-MCNC: 8.5 MG/DL — SIGNIFICANT CHANGE UP (ref 8.4–10.5)
CHLORIDE SERPL-SCNC: 103 MMOL/L — SIGNIFICANT CHANGE UP (ref 96–108)
CO2 SERPL-SCNC: 26 MMOL/L — SIGNIFICANT CHANGE UP (ref 22–31)
CREAT SERPL-MCNC: 0.78 MG/DL — SIGNIFICANT CHANGE UP (ref 0.5–1.3)
EGFR: 95 ML/MIN/1.73M2 — SIGNIFICANT CHANGE UP
EOSINOPHIL # BLD AUTO: 0 K/UL — SIGNIFICANT CHANGE UP (ref 0–0.5)
EOSINOPHIL NFR BLD AUTO: 0 % — SIGNIFICANT CHANGE UP (ref 0–6)
GLUCOSE SERPL-MCNC: 125 MG/DL — HIGH (ref 70–99)
HCT VFR BLD CALC: 36.4 % — LOW (ref 39–50)
HGB BLD-MCNC: 12.3 G/DL — LOW (ref 13–17)
IMM GRANULOCYTES NFR BLD AUTO: 0.4 % — SIGNIFICANT CHANGE UP (ref 0–0.9)
LYMPHOCYTES # BLD AUTO: 1.84 K/UL — SIGNIFICANT CHANGE UP (ref 1–3.3)
LYMPHOCYTES # BLD AUTO: 18.7 % — SIGNIFICANT CHANGE UP (ref 13–44)
MCHC RBC-ENTMCNC: 32.2 PG — SIGNIFICANT CHANGE UP (ref 27–34)
MCHC RBC-ENTMCNC: 33.8 G/DL — SIGNIFICANT CHANGE UP (ref 32–36)
MCV RBC AUTO: 95.3 FL — SIGNIFICANT CHANGE UP (ref 80–100)
MONOCYTES # BLD AUTO: 1.11 K/UL — HIGH (ref 0–0.9)
MONOCYTES NFR BLD AUTO: 11.3 % — SIGNIFICANT CHANGE UP (ref 2–14)
NEUTROPHILS # BLD AUTO: 6.82 K/UL — SIGNIFICANT CHANGE UP (ref 1.8–7.4)
NEUTROPHILS NFR BLD AUTO: 69.5 % — SIGNIFICANT CHANGE UP (ref 43–77)
NRBC # BLD: 0 /100 WBCS — SIGNIFICANT CHANGE UP (ref 0–0)
PLATELET # BLD AUTO: 141 K/UL — LOW (ref 150–400)
POTASSIUM SERPL-MCNC: 3.7 MMOL/L — SIGNIFICANT CHANGE UP (ref 3.5–5.3)
POTASSIUM SERPL-SCNC: 3.7 MMOL/L — SIGNIFICANT CHANGE UP (ref 3.5–5.3)
RBC # BLD: 3.82 M/UL — LOW (ref 4.2–5.8)
RBC # FLD: 13.2 % — SIGNIFICANT CHANGE UP (ref 10.3–14.5)
SODIUM SERPL-SCNC: 138 MMOL/L — SIGNIFICANT CHANGE UP (ref 135–145)
WBC # BLD: 9.82 K/UL — SIGNIFICANT CHANGE UP (ref 3.8–10.5)
WBC # FLD AUTO: 9.82 K/UL — SIGNIFICANT CHANGE UP (ref 3.8–10.5)

## 2024-12-12 PROCEDURE — 86901 BLOOD TYPING SEROLOGIC RH(D): CPT

## 2024-12-12 PROCEDURE — 85025 COMPLETE CBC W/AUTO DIFF WBC: CPT

## 2024-12-12 PROCEDURE — 86850 RBC ANTIBODY SCREEN: CPT

## 2024-12-12 PROCEDURE — C1889: CPT

## 2024-12-12 PROCEDURE — C1758: CPT

## 2024-12-12 PROCEDURE — S2900: CPT

## 2024-12-12 PROCEDURE — C2617: CPT

## 2024-12-12 PROCEDURE — 36415 COLL VENOUS BLD VENIPUNCTURE: CPT

## 2024-12-12 PROCEDURE — C9399: CPT

## 2024-12-12 PROCEDURE — 86900 BLOOD TYPING SEROLOGIC ABO: CPT

## 2024-12-12 PROCEDURE — 88305 TISSUE EXAM BY PATHOLOGIST: CPT

## 2024-12-12 PROCEDURE — C1769: CPT

## 2024-12-12 PROCEDURE — 76000 FLUOROSCOPY <1 HR PHYS/QHP: CPT

## 2024-12-12 PROCEDURE — 80048 BASIC METABOLIC PNL TOTAL CA: CPT

## 2024-12-12 RX ORDER — OXYCODONE HYDROCHLORIDE 30 MG/1
1 TABLET ORAL
Qty: 6 | Refills: 0
Start: 2024-12-12 | End: 2024-12-13

## 2024-12-12 RX ORDER — PHENAZOPYRIDINE HCL 200 MG
1 TABLET ORAL
Qty: 6 | Refills: 0
Start: 2024-12-12 | End: 2024-12-13

## 2024-12-12 RX ORDER — OXYCODONE HYDROCHLORIDE 30 MG/1
1 TABLET ORAL
Qty: 9 | Refills: 0
Start: 2024-12-12 | End: 2024-12-14

## 2024-12-12 RX ADMIN — ACETAMINOPHEN 500MG 975 MILLIGRAM(S): 500 TABLET, COATED ORAL at 01:56

## 2024-12-12 RX ADMIN — ACETAMINOPHEN 500MG 975 MILLIGRAM(S): 500 TABLET, COATED ORAL at 07:06

## 2024-12-12 RX ADMIN — Medication 100 MILLIGRAM(S): at 07:06

## 2024-12-12 RX ADMIN — Medication 5000 UNIT(S): at 07:07

## 2024-12-12 RX ADMIN — Medication 100 MILLIGRAM(S): at 01:56

## 2024-12-12 NOTE — DISCHARGE NOTE PROVIDER - NSDCFUADDAPPT_GEN_ALL_CORE_FT
Please call Dr. Kaiser office to confirm follow up appointment or any questions regarding prescriptions.

## 2024-12-12 NOTE — DISCHARGE NOTE PROVIDER - CARE PROVIDER_API CALL
Myron Kaiser  Urology  [address], [address]  [place] [address]  Phone: [phone]  Fax: [phone]  Established Patient  Follow Up Time: 2 weeks

## 2024-12-12 NOTE — DISCHARGE NOTE NURSING/CASE MANAGEMENT/SOCIAL WORK - NSDCPEFALRISK_GEN_ALL_CORE
For information on Fall & Injury Prevention, visit: [email or web address] OR  [email or web address] OR  https://www.cdc.gov/steadi/patient.html

## 2024-12-12 NOTE — DISCHARGE NOTE PROVIDER - HOSPITAL COURSE
Patient is a 69y old  Male who presents with scheduled     HPI: Patient is a 69M scheduled for Robotic pyeloplasty.  Patient has no complaints, and ready for OR.  Patient denies n/v/f/c, chest pain, sob.     12/12: Patient had no acute overnight events.  Patient passed TOV w/ 250cc output with 57cc PVR.  Patient cleared for discharge to home w/ 6 weeks of Bactrim daily and will need to follow up w/ Dr. Kaiser in 2 weeks.      Vital Signs Last 24 Hrs  T(C): 36.6 (12 Dec 2024 08:20), Max: 37.1 (11 Dec 2024 16:02)  T(F): 97.8 (12 Dec 2024 08:20), Max: 98.7 (11 Dec 2024 16:02)  HR: 57 (12 Dec 2024 08:20) (55 - 85)  BP: 123/69 (12 Dec 2024 08:20) (107/65 - 161/90)  BP(mean): 101 (11 Dec 2024 13:45) (78 - 120)  RR: 17 (12 Dec 2024 08:20) (0 - 20)  SpO2: 96% (12 Dec 2024 08:20) (84% - 100%)    Parameters below as of 12 Dec 2024 08:20  Patient On (Oxygen Delivery Method): room air      I&O's Summary    11 Dec 2024 07:01  -  12 Dec 2024 07:00  --------------------------------------------------------  IN: 1660 mL / OUT: 4005 mL / NET: -2345 mL        PE:  Gen: NAD, alert and oriented x 3   Abd: Appropriately soft, appropriately tender, incision sites C/D/I. ALMAS drain w. minimal output overnight 5cc total, serosanguinous  : Negative SP tenderness.   RANDAL: Deferred.

## 2024-12-12 NOTE — DISCHARGE NOTE NURSING/CASE MANAGEMENT/SOCIAL WORK - PATIENT PORTAL LINK FT
You can access the FollowMyHealth Patient Portal offered by [facility] by registering at the following website: [email or web address]. By joining [facility]’s FollowMyHealth portal, you will also be able to view your health information using other applications (apps) compatible with our system.

## 2024-12-12 NOTE — PROGRESS NOTE ADULT - ASSESSMENT
69 year old male with history of UPJ obstruction s/p L robotic pyeloplasty 12/11
69 year old male with history of UPJ obstruction s/p L robotic pyeloplasty

## 2024-12-12 NOTE — PROGRESS NOTE ADULT - SUBJECTIVE AND OBJECTIVE BOX
AM Note    No acute events overnight.      Vital Signs Last 24 Hrs  T(C): 36.5 (12-12-24 @ 04:53), Max: 37.1 (12-11-24 @ 16:02)  T(F): 97.7 (12-12-24 @ 04:53), Max: 98.7 (12-11-24 @ 16:02)  HR: 55 (12-12-24 @ 04:53) (55 - 85)  BP: 122/64 (12-12-24 @ 04:53) (107/65 - 165/85)  BP(mean): 101 (12-11-24 @ 13:45) (78 - 120)  RR: 18 (12-12-24 @ 04:53) (0 - 20)  SpO2: 97% (12-12-24 @ 04:53) (84% - 100%)                 I&O's Summary    11 Dec 2024 07:01  -  12 Dec 2024 05:24  --------------------------------------------------------  IN: 1300 mL / OUT: 3750 mL / NET: -2450 mL        PE  GEN: NAD  ABD: incisions dry and clean, ALMAS drain with serosanguinous output, softly distended, appropriately tender to palpation  : Olvera catheter in place, urine clear
 Post OP/Procedure note: ELLEN [id number]    Patient reports to minimal abdominal discomfort appropriate to current state. He denies any nausea, vomiting, chest pain, sob, dizziness, weakness. Denies suprapubic discomfort.     PE  GEN: NAD  ABD: incisions dry and clean, ALMAS drain with serosanguinous output, softly distended, appropriately tender to palpation  : Olvera catheter in place, urine clear    T(C): 36.3 (12-11-24 @ 14:49), Max: 36.4 (12-11-24 @ 06:10)  HR: 85 (12-11-24 @ 14:49) (72 - 85)  BP: 132/70 (12-11-24 @ 14:49) (107/65 - 165/85)  RR: 18 (12-11-24 @ 14:49) (0 - 20)  SpO2: 96% (12-11-24 @ 14:49) (84% - 100%)    12-11-24 @ 07:01  -  12-11-24 @ 15:40  --------------------------------------------------------  IN: 340 mL / OUT: 400 mL / NET: -60 mL

## 2024-12-12 NOTE — DISCHARGE NOTE PROVIDER - NSDCCPCAREPLAN_GEN_ALL_CORE_FT
PRINCIPAL DISCHARGE DIAGNOSIS  Diagnosis: UPJ (ureteropelvic junction) obstruction  Assessment and Plan of Treatment:       SECONDARY DISCHARGE DIAGNOSES  Diagnosis: HTN (hypertension)  Assessment and Plan of Treatment:     Diagnosis: HLD (hyperlipidemia)  Assessment and Plan of Treatment:

## 2024-12-12 NOTE — PROGRESS NOTE ADULT - PROBLEM SELECTOR PLAN 1
-stable  -dougherty-monitor I&Os  -ALMAS drain- monitor output- ALMAS Cr 5am  -OOB, IS  -pain control  -Diet: CLD  -am labs  -home meds  -DVT prophylaxis  -Abx prophylaxis
-stable  -dougherty-monitor I&Os  -ALMAS drain- monitor output- ALMAS Cr 5am  -OOB, IS  -pain control  -Diet: CLD  -am labs  -home meds  -DVT prophylaxis  -Abx prophylaxis

## 2024-12-12 NOTE — DISCHARGE NOTE PROVIDER - NSDCMRMEDTOKEN_GEN_ALL_CORE_FT
amLODIPine 10 mg oral tablet: 1 tab(s) orally once a day  hydroCHLOROthiazide 25 mg oral tablet: 1 tab(s) orally once a day  losartan potassium: 100mg  oxyCODONE 5 mg oral tablet: 1 tab(s) orally every 8 hours as needed for  severe pain MDD: 3  phenazopyridine 100 mg oral tablet: 1 tab(s) orally every 8 hours MDD: 2  rosuvastatin 20 mg oral tablet: 1 tab(s) orally once a day  sulfamethoxazole-trimethoprim 400 mg-80 mg oral tablet: 1 tab(s) orally once a day MDD: 1  tamsulosin 0.4 mg oral capsule: 1 cap(s) orally once a day

## 2024-12-12 NOTE — DISCHARGE NOTE PROVIDER - INSTRUCTIONS
Advance your diet as tolerated.  Would begin slowly introducing food, from clear liquids to full liquids and if tolerated, passing gas, and having Bowel movements; you can start eating your regular diet.  If at anytime you start having nausea stop eating and revert to foods you are able to tolerate.

## 2024-12-12 NOTE — DISCHARGE NOTE NURSING/CASE MANAGEMENT/SOCIAL WORK - FINANCIAL ASSISTANCE
[facility] provides services at a reduced cost to those who are determined to be eligible through [facility]’s financial assistance program. Information regarding [facility]’s financial assistance program can be found by going to [email or web address] or by calling [phone].

## 2024-12-12 NOTE — DISCHARGE NOTE PROVIDER - NSDCFUADDINST_GEN_ALL_CORE_FT
Your Antibiotic regimen will require you to take is Bactrim 400mg/80mg daily for 6 weeks.   For severe pain you may take the Oxycodone 5mg as needed every 8 hrs.  Tylenol for Mild to moderate pain 650mg every 6-8hrs as needed.    Walking is essential to your recovery, please walk around as much as you can tolerate once home, try not to stay in bed for too long, as this can slow down your recovery.   Drink plenty of water to flush out the bladder.   To avoid constipation, take a stool softener as needed.   Blood in your urine. It's normal to see blood right after surgery. Urination might be painful, or you might have a sense of urgency or frequent need to urinate. This is normal.    ****If you have any fevers/chills greater than 100.4F, unable to void, or start having nausea with vomiting that does not stop, please go to your nearest emergency room or call Dr. Kaiser' office. ****

## 2024-12-13 ENCOUNTER — APPOINTMENT (OUTPATIENT)
Dept: UROLOGY | Facility: CLINIC | Age: 69
End: 2024-12-13

## 2024-12-18 DIAGNOSIS — N13.0 HYDRONEPHROSIS WITH URETEROPELVIC JUNCTION OBSTRUCTION: ICD-10-CM

## 2024-12-18 DIAGNOSIS — E78.5 HYPERLIPIDEMIA, UNSPECIFIED: ICD-10-CM

## 2024-12-18 DIAGNOSIS — I10 ESSENTIAL (PRIMARY) HYPERTENSION: ICD-10-CM

## 2025-01-13 ENCOUNTER — APPOINTMENT (OUTPATIENT)
Dept: UROLOGY | Facility: CLINIC | Age: 70
End: 2025-01-13
Payer: MEDICARE

## 2025-01-13 VITALS
BODY MASS INDEX: 26.36 KG/M2 | OXYGEN SATURATION: 98 % | TEMPERATURE: 97.8 F | SYSTOLIC BLOOD PRESSURE: 131 MMHG | RESPIRATION RATE: 15 BRPM | WEIGHT: 164 LBS | HEART RATE: 60 BPM | DIASTOLIC BLOOD PRESSURE: 72 MMHG | HEIGHT: 66 IN

## 2025-01-13 DIAGNOSIS — N13.30 UNSPECIFIED HYDRONEPHROSIS: ICD-10-CM

## 2025-01-13 DIAGNOSIS — N13.5 CROSSING VESSEL AND STRICTURE OF URETER W/OUT HYDRONEPHROSIS: ICD-10-CM

## 2025-01-13 PROCEDURE — 99024 POSTOP FOLLOW-UP VISIT: CPT

## 2025-01-13 PROCEDURE — 52310 CYSTOSCOPY AND TREATMENT: CPT | Mod: 58

## 2025-01-15 LAB — BACTERIA UR CULT: NORMAL

## 2025-02-24 ENCOUNTER — LABORATORY RESULT (OUTPATIENT)
Age: 70
End: 2025-02-24

## 2025-03-31 ENCOUNTER — APPOINTMENT (OUTPATIENT)
Dept: UROLOGY | Facility: CLINIC | Age: 70
End: 2025-03-31
Payer: MEDICARE

## 2025-03-31 ENCOUNTER — NON-APPOINTMENT (OUTPATIENT)
Age: 70
End: 2025-03-31

## 2025-03-31 VITALS
SYSTOLIC BLOOD PRESSURE: 146 MMHG | OXYGEN SATURATION: 97 % | RESPIRATION RATE: 15 BRPM | HEART RATE: 74 BPM | TEMPERATURE: 98.3 F | DIASTOLIC BLOOD PRESSURE: 75 MMHG | WEIGHT: 190 LBS | HEIGHT: 66 IN | BODY MASS INDEX: 30.53 KG/M2

## 2025-03-31 DIAGNOSIS — N40.1 BENIGN PROSTATIC HYPERPLASIA WITH LOWER URINARY TRACT SYMPMS: ICD-10-CM

## 2025-03-31 DIAGNOSIS — N13.30 UNSPECIFIED HYDRONEPHROSIS: ICD-10-CM

## 2025-03-31 DIAGNOSIS — N13.5 CROSSING VESSEL AND STRICTURE OF URETER W/OUT HYDRONEPHROSIS: ICD-10-CM

## 2025-03-31 PROCEDURE — G2211 COMPLEX E/M VISIT ADD ON: CPT

## 2025-03-31 PROCEDURE — 99214 OFFICE O/P EST MOD 30 MIN: CPT

## 2025-08-12 ENCOUNTER — APPOINTMENT (OUTPATIENT)
Dept: NUCLEAR MEDICINE | Facility: HOSPITAL | Age: 70
End: 2025-08-12

## 2025-08-25 ENCOUNTER — APPOINTMENT (OUTPATIENT)
Dept: UROLOGY | Facility: CLINIC | Age: 70
End: 2025-08-25

## (undated) DEVICE — DRSG DERMABOND 0.7ML

## (undated) DEVICE — INSUFFLATION NDL COVIDIEN SURGINEEDLE VERESS 120MM

## (undated) DEVICE — XI VESSEL SEALER

## (undated) DEVICE — DRAIN SILICONE ROUND 9FR

## (undated) DEVICE — FOLEY TRAY 16FR 5CC LF UMETER CLOSED

## (undated) DEVICE — XI ARM FORCEP PROGRASP 8MM

## (undated) DEVICE — XI STAPLER SUREFORM 60

## (undated) DEVICE — SUT SILK 2-0 30" SH

## (undated) DEVICE — SUT VICRYL 2-0 27" SH

## (undated) DEVICE — XI OBTURATOR OPTICAL BLADELESS 8MM

## (undated) DEVICE — XI DRAPE ARM

## (undated) DEVICE — DRSG TAPE UMBILICAL COTTON 2" X 30 X 1/8"

## (undated) DEVICE — TROCAR COVIDIEN VERSAPORT BLADELESS OPTICAL 12MM STANDARD

## (undated) DEVICE — XI DRAPE COLUMN

## (undated) DEVICE — ELCTR BOVIE PENCIL BLADE 10FT

## (undated) DEVICE — D HELP - CLEARVIEW CLEARIFY SYSTEM

## (undated) DEVICE — XI ARM NEEDLE DRIVER LARGE

## (undated) DEVICE — SUT PROLENE 4-0 36" RB-1

## (undated) DEVICE — XI TIP COVER

## (undated) DEVICE — XI SEAL UNIVERSIAL 5-12MM

## (undated) DEVICE — XI ARM FORCEP FENESTRATED BIPOLAR 8MM

## (undated) DEVICE — ENDOCATCH 12/15MM INZII

## (undated) DEVICE — DRAPE INSTRUMENT POUCH 10" X 18"

## (undated) DEVICE — SUT VICRYL 3-0 27" SH

## (undated) DEVICE — XI ARM FORCEP MARYLAND BIPOLAR

## (undated) DEVICE — SUT VLOC 180 2-0 9" GS-22 GREEN

## (undated) DEVICE — SUT VICRYL 4-0 27" RB-1 UNDYED

## (undated) DEVICE — SUT VICRYL PLUS 3-0 27" RB-1 UNDYED

## (undated) DEVICE — LAP PAD 4 X 18"

## (undated) DEVICE — DRAIN JACKSON PRATT 10MM FLAT 3/4 NO TROCAR

## (undated) DEVICE — TROCAR COVIDIEN VERSAPORT BLADELESS OPTICAL 5MM STANDARD

## (undated) DEVICE — SUT VICRYL PLUS 0 54" TIES

## (undated) DEVICE — Device

## (undated) DEVICE — XI ARM CLIP APPLIER LARGE

## (undated) DEVICE — XI ARM SCISSOR MONO CURVED

## (undated) DEVICE — TUBING AIRSEAL TRI-LUMEN FILTERED

## (undated) DEVICE — ENDOCATCH 10MM

## (undated) DEVICE — TIP METZENBAUM SCISSOR MONOPOLAR ENDOCUT (ORANGE)

## (undated) DEVICE — SUT MONOCRYL 4-0 27" PS-2 UNDYED

## (undated) DEVICE — SUT VICRYL 0 27" UR-6

## (undated) DEVICE — TROCAR SURGIQUEST AIRSEAL 12MM X 100MM

## (undated) DEVICE — DRAINAGE BAG URINARY 2L

## (undated) DEVICE — SUT VLOC 180 3-0 6" V-20 GREEN

## (undated) DEVICE — GLV 7.5 PROTEXIS (WHITE)

## (undated) DEVICE — DRAIN RESERVOIR FOR JACKSON PRATT 100CC CARDINAL